# Patient Record
Sex: FEMALE | Race: ASIAN | NOT HISPANIC OR LATINO
[De-identification: names, ages, dates, MRNs, and addresses within clinical notes are randomized per-mention and may not be internally consistent; named-entity substitution may affect disease eponyms.]

---

## 2022-01-31 ENCOUNTER — RESULT REVIEW (OUTPATIENT)
Age: 32
End: 2022-01-31

## 2022-02-17 ENCOUNTER — ASOB RESULT (OUTPATIENT)
Age: 32
End: 2022-02-17

## 2022-02-17 ENCOUNTER — APPOINTMENT (OUTPATIENT)
Dept: ANTEPARTUM | Facility: CLINIC | Age: 32
End: 2022-02-17
Payer: COMMERCIAL

## 2022-02-17 PROBLEM — Z00.00 ENCOUNTER FOR PREVENTIVE HEALTH EXAMINATION: Status: ACTIVE | Noted: 2022-02-17

## 2022-02-17 PROCEDURE — 76813 OB US NUCHAL MEAS 1 GEST: CPT | Mod: 59

## 2022-02-17 PROCEDURE — 36416 COLLJ CAPILLARY BLOOD SPEC: CPT

## 2022-02-17 PROCEDURE — 76801 OB US < 14 WKS SINGLE FETUS: CPT

## 2022-02-24 ENCOUNTER — TRANSCRIPTION ENCOUNTER (OUTPATIENT)
Age: 32
End: 2022-02-24

## 2022-04-11 ENCOUNTER — APPOINTMENT (OUTPATIENT)
Dept: ANTEPARTUM | Facility: CLINIC | Age: 32
End: 2022-04-11
Payer: COMMERCIAL

## 2022-04-11 ENCOUNTER — ASOB RESULT (OUTPATIENT)
Age: 32
End: 2022-04-11

## 2022-04-11 PROCEDURE — 76811 OB US DETAILED SNGL FETUS: CPT

## 2022-04-11 PROCEDURE — 99202 OFFICE O/P NEW SF 15 MIN: CPT | Mod: 25

## 2022-05-10 ENCOUNTER — APPOINTMENT (OUTPATIENT)
Dept: PEDIATRIC CARDIOLOGY | Facility: CLINIC | Age: 32
End: 2022-05-10
Payer: COMMERCIAL

## 2022-05-10 PROCEDURE — 93325 DOPPLER ECHO COLOR FLOW MAPG: CPT | Mod: 59

## 2022-05-10 PROCEDURE — 99203 OFFICE O/P NEW LOW 30 MIN: CPT

## 2022-05-10 PROCEDURE — 76827 ECHO EXAM OF FETAL HEART: CPT

## 2022-05-10 PROCEDURE — 76820 UMBILICAL ARTERY ECHO: CPT

## 2022-05-10 PROCEDURE — 76825 ECHO EXAM OF FETAL HEART: CPT

## 2022-06-08 ENCOUNTER — ASOB RESULT (OUTPATIENT)
Age: 32
End: 2022-06-08

## 2022-06-08 ENCOUNTER — APPOINTMENT (OUTPATIENT)
Dept: ANTEPARTUM | Facility: CLINIC | Age: 32
End: 2022-06-08
Payer: COMMERCIAL

## 2022-06-08 PROCEDURE — 76819 FETAL BIOPHYS PROFIL W/O NST: CPT

## 2022-06-08 PROCEDURE — 76816 OB US FOLLOW-UP PER FETUS: CPT

## 2022-07-08 ENCOUNTER — APPOINTMENT (OUTPATIENT)
Dept: ANTEPARTUM | Facility: CLINIC | Age: 32
End: 2022-07-08

## 2022-07-08 ENCOUNTER — ASOB RESULT (OUTPATIENT)
Age: 32
End: 2022-07-08

## 2022-07-08 PROCEDURE — 76819 FETAL BIOPHYS PROFIL W/O NST: CPT

## 2022-07-08 PROCEDURE — 76816 OB US FOLLOW-UP PER FETUS: CPT

## 2022-08-05 ENCOUNTER — ASOB RESULT (OUTPATIENT)
Age: 32
End: 2022-08-05

## 2022-08-05 ENCOUNTER — APPOINTMENT (OUTPATIENT)
Dept: ANTEPARTUM | Facility: CLINIC | Age: 32
End: 2022-08-05

## 2022-08-05 PROCEDURE — 76819 FETAL BIOPHYS PROFIL W/O NST: CPT

## 2022-08-05 PROCEDURE — 76816 OB US FOLLOW-UP PER FETUS: CPT

## 2022-08-23 ENCOUNTER — INPATIENT (INPATIENT)
Facility: HOSPITAL | Age: 32
LOS: 1 days | Discharge: HOME | End: 2022-08-25
Attending: OBSTETRICS & GYNECOLOGY | Admitting: OBSTETRICS & GYNECOLOGY

## 2022-08-23 VITALS
DIASTOLIC BLOOD PRESSURE: 63 MMHG | TEMPERATURE: 98 F | SYSTOLIC BLOOD PRESSURE: 118 MMHG | HEART RATE: 80 BPM | RESPIRATION RATE: 16 BRPM

## 2022-08-23 DIAGNOSIS — Z90.79 ACQUIRED ABSENCE OF OTHER GENITAL ORGAN(S): Chronic | ICD-10-CM

## 2022-08-23 DIAGNOSIS — O26.899 OTHER SPECIFIED PREGNANCY RELATED CONDITIONS, UNSPECIFIED TRIMESTER: ICD-10-CM

## 2022-08-23 DIAGNOSIS — Z3A.00 WEEKS OF GESTATION OF PREGNANCY NOT SPECIFIED: ICD-10-CM

## 2022-08-23 LAB
BASOPHILS # BLD AUTO: 0.01 K/UL — SIGNIFICANT CHANGE UP (ref 0–0.2)
BASOPHILS NFR BLD AUTO: 0.1 % — SIGNIFICANT CHANGE UP (ref 0–2)
BLD GP AB SCN SERPL QL: NEGATIVE — SIGNIFICANT CHANGE UP
COVID-19 SPIKE DOMAIN AB INTERP: POSITIVE
COVID-19 SPIKE DOMAIN ANTIBODY RESULT: >250 U/ML — HIGH
EOSINOPHIL # BLD AUTO: 0.04 K/UL — SIGNIFICANT CHANGE UP (ref 0–0.5)
EOSINOPHIL NFR BLD AUTO: 0.6 % — SIGNIFICANT CHANGE UP (ref 0–6)
HCT VFR BLD CALC: 45.2 % — HIGH (ref 34.5–45)
HGB BLD-MCNC: 14.6 G/DL — SIGNIFICANT CHANGE UP (ref 11.5–15.5)
IANC: 5.29 K/UL — SIGNIFICANT CHANGE UP (ref 1.8–7.4)
IMM GRANULOCYTES NFR BLD AUTO: 0.4 % — SIGNIFICANT CHANGE UP (ref 0–1.5)
LYMPHOCYTES # BLD AUTO: 1.19 K/UL — SIGNIFICANT CHANGE UP (ref 1–3.3)
LYMPHOCYTES # BLD AUTO: 17.2 % — SIGNIFICANT CHANGE UP (ref 13–44)
MCHC RBC-ENTMCNC: 29.3 PG — SIGNIFICANT CHANGE UP (ref 27–34)
MCHC RBC-ENTMCNC: 32.3 GM/DL — SIGNIFICANT CHANGE UP (ref 32–36)
MCV RBC AUTO: 90.8 FL — SIGNIFICANT CHANGE UP (ref 80–100)
MONOCYTES # BLD AUTO: 0.37 K/UL — SIGNIFICANT CHANGE UP (ref 0–0.9)
MONOCYTES NFR BLD AUTO: 5.3 % — SIGNIFICANT CHANGE UP (ref 2–14)
NEUTROPHILS # BLD AUTO: 5.29 K/UL — SIGNIFICANT CHANGE UP (ref 1.8–7.4)
NEUTROPHILS NFR BLD AUTO: 76.4 % — SIGNIFICANT CHANGE UP (ref 43–77)
NRBC # BLD: 0 /100 WBCS — SIGNIFICANT CHANGE UP (ref 0–0)
NRBC # FLD: 0 K/UL — SIGNIFICANT CHANGE UP (ref 0–0)
PLATELET # BLD AUTO: 153 K/UL — SIGNIFICANT CHANGE UP (ref 150–400)
RBC # BLD: 4.98 M/UL — SIGNIFICANT CHANGE UP (ref 3.8–5.2)
RBC # FLD: 12.8 % — SIGNIFICANT CHANGE UP (ref 10.3–14.5)
RH IG SCN BLD-IMP: POSITIVE — SIGNIFICANT CHANGE UP
RH IG SCN BLD-IMP: POSITIVE — SIGNIFICANT CHANGE UP
SARS-COV-2 IGG+IGM SERPL QL IA: >250 U/ML — HIGH
SARS-COV-2 IGG+IGM SERPL QL IA: POSITIVE
SARS-COV-2 RNA SPEC QL NAA+PROBE: DETECTED
WBC # BLD: 6.93 K/UL — SIGNIFICANT CHANGE UP (ref 3.8–10.5)
WBC # FLD AUTO: 6.93 K/UL — SIGNIFICANT CHANGE UP (ref 3.8–10.5)

## 2022-08-23 RX ORDER — OXYTOCIN 10 UNIT/ML
333.33 VIAL (ML) INJECTION
Qty: 20 | Refills: 0 | Status: DISCONTINUED | OUTPATIENT
Start: 2022-08-23 | End: 2022-08-24

## 2022-08-23 RX ORDER — CITRIC ACID/SODIUM CITRATE 300-500 MG
15 SOLUTION, ORAL ORAL EVERY 6 HOURS
Refills: 0 | Status: DISCONTINUED | OUTPATIENT
Start: 2022-08-23 | End: 2022-08-24

## 2022-08-23 RX ORDER — SODIUM CHLORIDE 9 MG/ML
1000 INJECTION, SOLUTION INTRAVENOUS
Refills: 0 | Status: DISCONTINUED | OUTPATIENT
Start: 2022-08-23 | End: 2022-08-24

## 2022-08-23 RX ORDER — AMPICILLIN TRIHYDRATE 250 MG
1 CAPSULE ORAL EVERY 4 HOURS
Refills: 0 | Status: DISCONTINUED | OUTPATIENT
Start: 2022-08-23 | End: 2022-08-24

## 2022-08-23 RX ORDER — CHLORHEXIDINE GLUCONATE 213 G/1000ML
1 SOLUTION TOPICAL ONCE
Refills: 0 | Status: DISCONTINUED | OUTPATIENT
Start: 2022-08-23 | End: 2022-08-24

## 2022-08-23 RX ORDER — AMPICILLIN TRIHYDRATE 250 MG
2 CAPSULE ORAL ONCE
Refills: 0 | Status: COMPLETED | OUTPATIENT
Start: 2022-08-23 | End: 2022-08-23

## 2022-08-23 RX ADMIN — Medication 108 GRAM(S): at 22:08

## 2022-08-23 RX ADMIN — SODIUM CHLORIDE 125 MILLILITER(S): 9 INJECTION, SOLUTION INTRAVENOUS at 18:54

## 2022-08-23 RX ADMIN — Medication 216 GRAM(S): at 18:00

## 2022-08-23 NOTE — OB PROVIDER H&P - HISTORY OF PRESENT ILLNESS
33 yo  38w6d presenting with episode of leaking brownish / dark reddish vaginal discharge and contractions q5-10 minutes this morning at 08:00. Notes that underwear was wet. Was unsure if water broke so waited and had a similar episode 2 hours ago, prompting her to call her obstetrician who recommended she get evaluated. + FM.   A/P Course: IVF pregnancy. Notes was told has, "bulky" placenta. Otherwise denies  NKDA  POBHx;  - Ectopic pregnancy , IVF  PMHx:  - Denies   PGYNHx:  - Hx of HSV, no recent outbreaks within last several years, been ppx on valtrex since 36w  - Ectopic pregnancy  resulting in left salpingectomy  -  R salpingectomy during "procedure for endometriosis." Pt unable to provide further details      31 yo  38w6d presenting with episode of leaking brownish / dark reddish vaginal discharge and contractions q5-10 minutes this morning at 08:00. Notes that underwear was wet. Was unsure if water broke so waited and had a similar episode 2 hours ago, prompting her to call her obstetrician who recommended she presents for evaluation. + FM.     A/P Course: IVF pregnancy. Notes was told has, "bulky" placenta. Otherwise denies  NKDA  POBHx;  - Ectopic pregnancy , IVF  PMHx:  - Denies   PGYNHx:  - Hx of HSV, no recent outbreaks within last several years, been ppx on valtrex since 36w  - Ectopic pregnancy  resulting in left salpingectomy  -  R salpingectomy during "procedure for endometriosis." Pt unable to provide further details

## 2022-08-23 NOTE — OB PROVIDER H&P - ASSESSMENT
33 yo  38w6d presenting with episode of leaking brownish / dark reddish vaginal discharge and contractions q5-10 minutes this morning at 08:00 c/w labor at term. Indeterminate regarding ROM, unable to perform amnisure due to bloody discharge. Patient desires epidural. GBS +. 6#8, per patient 2-3 weeks ago.     Plan  - Admit to labor and delivery for labor at term  - Patient and partner informed of plan and demonstrate understanding. All questions answered. Consents signed. Covid swabbed and sent.     Plan d/w Dr. Sevilla 31 yo  38w6d shows evidence of active labor and requesting for epidural at this time    Plan  - Admit to labor and delivery for labor at term  - Patient and partner informed of plan and demonstrate understanding. All questions answered. Consents signed. Covid swabbed and sent.   - GBS positive, for Ampicillin  Plan d/w Dr. Sevilla

## 2022-08-23 NOTE — OB PROVIDER TRIAGE NOTE - HISTORY OF PRESENT ILLNESS
33 yo  38w6d presenting with episode of leaking brownish / dark reddish vaginal discharge and contractions q5-10 minutes this morning at 08:00. Notes that underwear was wet. Was unsure if water broke so waited and had a similar episode 2 hours ago, prompting her to call her obstetrician who recommended she get evaluated. + FM.   A/P Course: IVF pregnancy. Notes was told has, "bulky" placenta. Otherwise denies  NKDA  POBHx;  - Ectopic pregnancy , IVF  PMHx:  - Denies   PGYNHx:  - Hx of HSV, no recent outbreaks within last several years, been ppx on valtrex since 36w  - Ectopic pregnancy  resulting in left salpingectomy  -  R salpingectomy during "procedure for endometriosis." Pt unable to provide further details

## 2022-08-23 NOTE — OB PROVIDER H&P - NSHPPHYSICALEXAM_GEN_ALL_CORE
Vital Signs Last 24 Hrs  T(C): 36.4 (23 Aug 2022 13:18), Max: 36.4 (23 Aug 2022 13:18)  T(F): 97.5 (23 Aug 2022 13:18), Max: 97.5 (23 Aug 2022 13:18)  HR: 88 (23 Aug 2022 16:51) (74 - 88)  BP: 111/81 (23 Aug 2022 16:51) (111/78 - 126/90)  BP(mean): --  RR: 16 (23 Aug 2022 13:18) (16 - 16)    General: Female sitting comfortably between contractions last approx 15-20 seconds every 3-5 minutes  Head: Normocephalic. Atraumatic.   Eyes: No discharge, lids normal, conjunctiva normal  Abdomen: Soft, nontender. Gravid.   Lungs: No resp distress  SSE: Dark brown mucous / blood present at posterior fornix. 3 large cotton swabs required to remove bloody discharge. Nitrazine equivocal. Ferning negative.   SVE: 4 / 70 / -3  TAUS: Vertex.  Neuro: No facial asymmetry, no slurred speech, moves all 4 extremities  Mood: Alert and lucid, appropriate mood and affect Vital Signs Last 24 Hrs  T(C): 36.4 (23 Aug 2022 13:18), Max: 36.4 (23 Aug 2022 13:18)  T(F): 97.5 (23 Aug 2022 13:18), Max: 97.5 (23 Aug 2022 13:18)  HR: 88 (23 Aug 2022 16:51) (74 - 88)  BP: 111/81 (23 Aug 2022 16:51) (111/78 - 126/90)  BP(mean): --  RR: 16 (23 Aug 2022 13:18) (16 - 16)    General: Female sitting comfortably between contractions last approx 15-20 seconds every 3-5 minutes  Head: Normocephalic. Atraumatic.   Eyes: No discharge, lids normal, conjunctiva normal  Abdomen: Soft, nontender. Gravid.   Lungs: No resp distress  SSE: No evidence of lesions or vesicles at external genitalia or at vaginal walls. Dark brown mucous / blood present at posterior fornix. 3 large cotton swabs required to remove bloody discharge. Nitrazine equivocal. [place] negative.   SVE: 4 / 70 / -3  TAUS: Vertex.  Neuro: No facial asymmetry, no slurred speech, moves all 4 extremities  Mood: Alert and lucid, appropriate mood and affect

## 2022-08-23 NOTE — OB PROVIDER TRIAGE NOTE - NSOBPROVIDERNOTE_OBGYN_ALL_OB_FT
33 yo  38w6d presenting with episode of leaking brownish / dark reddish vaginal discharge and contractions q5-10 minutes this morning at 08:00 c/w labor at term. Indeterminate regarding ROM, unable to perform amnisure due to bloody discharge. Patient desires epidural. GBS +. 6#8, per patient 2-3 weeks ago.     Plan  - Admit to labor and delivery for labor at term  - Patient and partner informed of plan and demonstrate understanding. All questions answered. Consents signed. Covid swabbed and sent.     Plan d/w Dr. Sevilla

## 2022-08-23 NOTE — OB PROVIDER H&P - PROBLEM SELECTOR PLAN 1
31 yo  38w6d shows evidence of active labor and requesting for epidural at this time    Plan  - Admit to labor and delivery for labor at term  - Patient and partner informed of plan and demonstrate understanding. All questions answered. Consents signed. Covid swabbed and sent.   - GBS positive, for Ampicillin  Plan d/w Dr. Sevilla

## 2022-08-23 NOTE — OB RN PATIENT PROFILE - DATE OF FIRST COVID-19 BOOSTER
Product 15 Price (In Dollars - Numeric Only, No Special Characters Or $): 0.00 Product 8 Units: 0 Name Of Product 2: UV Clear (untinted) Name Of Product 1: UV Clear (tinted) Name Of Product 3: UV Daily Send Charges To Patient Encounter: No Product 1 Application Directions: Apply to entire face every morning, after applying medications. Allow Plan To Count Towards E/M Coding: Yes Risk Of Complication Category: Low (OTC Medications) Detail Level: Zone Product 2 Units: 1 Assigning Risk Information: Per AMA, level of risk is based upon consequences of the problem(s) addressed at the encounter when appropriately treated. Risk also includes medical decision making related to the need to initiate or forego further testing, treatment and/or hospitalization. Over the counter medication are assigned a risk level of low. Prescription medication management is assigned a risk level of moderate. 25-Mar-2022

## 2022-08-23 NOTE — OB RN PATIENT PROFILE - FALL HARM RISK - UNIVERSAL INTERVENTIONS
Bed in lowest position, wheels locked, appropriate side rails in place/Call bell, personal items and telephone in reach/Instruct patient to call for assistance before getting out of bed or chair/Non-slip footwear when patient is out of bed/[place] to call system/Physically safe environment - no spills, clutter or unnecessary equipment/Purposeful Proactive Rounding/Room/bathroom lighting operational, light cord in reach

## 2022-08-24 ENCOUNTER — TRANSCRIPTION ENCOUNTER (OUTPATIENT)
Age: 32
End: 2022-08-24

## 2022-08-24 LAB — T PALLIDUM AB TITR SER: NEGATIVE — SIGNIFICANT CHANGE UP

## 2022-08-24 RX ORDER — PRAMOXINE HYDROCHLORIDE 150 MG/15G
1 AEROSOL, FOAM RECTAL EVERY 4 HOURS
Refills: 0 | Status: DISCONTINUED | OUTPATIENT
Start: 2022-08-24 | End: 2022-08-25

## 2022-08-24 RX ORDER — HYDROCORTISONE 1 %
1 OINTMENT (GRAM) TOPICAL EVERY 6 HOURS
Refills: 0 | Status: DISCONTINUED | OUTPATIENT
Start: 2022-08-24 | End: 2022-08-25

## 2022-08-24 RX ORDER — BENZOCAINE 10 %
1 GEL (GRAM) MUCOUS MEMBRANE EVERY 6 HOURS
Refills: 0 | Status: DISCONTINUED | OUTPATIENT
Start: 2022-08-24 | End: 2022-08-25

## 2022-08-24 RX ORDER — ACETAMINOPHEN 500 MG
3 TABLET ORAL
Qty: 0 | Refills: 0 | DISCHARGE
Start: 2022-08-24

## 2022-08-24 RX ORDER — SODIUM CHLORIDE 9 MG/ML
1000 INJECTION INTRAMUSCULAR; INTRAVENOUS; SUBCUTANEOUS
Refills: 0 | Status: DISCONTINUED | OUTPATIENT
Start: 2022-08-24 | End: 2022-08-24

## 2022-08-24 RX ORDER — VALACYCLOVIR 500 MG/1
0 TABLET, FILM COATED ORAL
Qty: 0 | Refills: 0 | DISCHARGE

## 2022-08-24 RX ORDER — OXYCODONE HYDROCHLORIDE 5 MG/1
5 TABLET ORAL
Refills: 0 | Status: DISCONTINUED | OUTPATIENT
Start: 2022-08-24 | End: 2022-08-25

## 2022-08-24 RX ORDER — IBUPROFEN 200 MG
600 TABLET ORAL EVERY 6 HOURS
Refills: 0 | Status: DISCONTINUED | OUTPATIENT
Start: 2022-08-24 | End: 2022-08-25

## 2022-08-24 RX ORDER — IBUPROFEN 200 MG
1 TABLET ORAL
Qty: 0 | Refills: 0 | DISCHARGE
Start: 2022-08-24

## 2022-08-24 RX ORDER — LANOLIN
1 OINTMENT (GRAM) TOPICAL EVERY 6 HOURS
Refills: 0 | Status: DISCONTINUED | OUTPATIENT
Start: 2022-08-24 | End: 2022-08-25

## 2022-08-24 RX ORDER — DIPHENHYDRAMINE HCL 50 MG
25 CAPSULE ORAL EVERY 6 HOURS
Refills: 0 | Status: DISCONTINUED | OUTPATIENT
Start: 2022-08-24 | End: 2022-08-25

## 2022-08-24 RX ORDER — OXYCODONE HYDROCHLORIDE 5 MG/1
5 TABLET ORAL ONCE
Refills: 0 | Status: DISCONTINUED | OUTPATIENT
Start: 2022-08-24 | End: 2022-08-25

## 2022-08-24 RX ORDER — SODIUM CHLORIDE 9 MG/ML
500 INJECTION INTRAMUSCULAR; INTRAVENOUS; SUBCUTANEOUS ONCE
Refills: 0 | Status: DISCONTINUED | OUTPATIENT
Start: 2022-08-24 | End: 2022-08-24

## 2022-08-24 RX ORDER — SIMETHICONE 80 MG/1
80 TABLET, CHEWABLE ORAL EVERY 4 HOURS
Refills: 0 | Status: DISCONTINUED | OUTPATIENT
Start: 2022-08-24 | End: 2022-08-25

## 2022-08-24 RX ORDER — ACETAMINOPHEN 500 MG
975 TABLET ORAL
Refills: 0 | Status: DISCONTINUED | OUTPATIENT
Start: 2022-08-24 | End: 2022-08-25

## 2022-08-24 RX ORDER — AER TRAVELER 0.5 G/1
1 SOLUTION RECTAL; TOPICAL EVERY 4 HOURS
Refills: 0 | Status: DISCONTINUED | OUTPATIENT
Start: 2022-08-24 | End: 2022-08-25

## 2022-08-24 RX ORDER — OXYTOCIN 10 UNIT/ML
333.33 VIAL (ML) INJECTION
Qty: 20 | Refills: 0 | Status: DISCONTINUED | OUTPATIENT
Start: 2022-08-24 | End: 2022-08-24

## 2022-08-24 RX ORDER — SODIUM CHLORIDE 9 MG/ML
3 INJECTION INTRAMUSCULAR; INTRAVENOUS; SUBCUTANEOUS EVERY 8 HOURS
Refills: 0 | Status: DISCONTINUED | OUTPATIENT
Start: 2022-08-24 | End: 2022-08-25

## 2022-08-24 RX ORDER — MAGNESIUM HYDROXIDE 400 MG/1
30 TABLET, CHEWABLE ORAL
Refills: 0 | Status: DISCONTINUED | OUTPATIENT
Start: 2022-08-24 | End: 2022-08-25

## 2022-08-24 RX ORDER — KETOROLAC TROMETHAMINE 30 MG/ML
30 SYRINGE (ML) INJECTION ONCE
Refills: 0 | Status: DISCONTINUED | OUTPATIENT
Start: 2022-08-24 | End: 2022-08-24

## 2022-08-24 RX ORDER — OXYTOCIN 10 UNIT/ML
2 VIAL (ML) INJECTION
Qty: 30 | Refills: 0 | Status: DISCONTINUED | OUTPATIENT
Start: 2022-08-24 | End: 2022-08-24

## 2022-08-24 RX ORDER — TETANUS TOXOID, REDUCED DIPHTHERIA TOXOID AND ACELLULAR PERTUSSIS VACCINE, ADSORBED 5; 2.5; 8; 8; 2.5 [IU]/.5ML; [IU]/.5ML; UG/.5ML; UG/.5ML; UG/.5ML
0.5 SUSPENSION INTRAMUSCULAR ONCE
Refills: 0 | Status: DISCONTINUED | OUTPATIENT
Start: 2022-08-24 | End: 2022-08-25

## 2022-08-24 RX ORDER — IBUPROFEN 200 MG
600 TABLET ORAL EVERY 6 HOURS
Refills: 0 | Status: COMPLETED | OUTPATIENT
Start: 2022-08-24 | End: 2023-07-23

## 2022-08-24 RX ORDER — DIBUCAINE 1 %
1 OINTMENT (GRAM) RECTAL EVERY 6 HOURS
Refills: 0 | Status: DISCONTINUED | OUTPATIENT
Start: 2022-08-24 | End: 2022-08-25

## 2022-08-24 RX ADMIN — SODIUM CHLORIDE 3 MILLILITER(S): 9 INJECTION INTRAMUSCULAR; INTRAVENOUS; SUBCUTANEOUS at 18:13

## 2022-08-24 RX ADMIN — Medication 600 MILLIGRAM(S): at 23:37

## 2022-08-24 RX ADMIN — Medication 600 MILLIGRAM(S): at 23:03

## 2022-08-24 RX ADMIN — Medication 108 GRAM(S): at 02:05

## 2022-08-24 RX ADMIN — SODIUM CHLORIDE 3 MILLILITER(S): 9 INJECTION INTRAMUSCULAR; INTRAVENOUS; SUBCUTANEOUS at 22:54

## 2022-08-24 RX ADMIN — Medication 2 MILLIUNIT(S)/MIN: at 01:33

## 2022-08-24 NOTE — OB PROVIDER DELIVERY SUMMARY - NSPROVIDERDELIVERYNOTE_OBGYN_ALL_OB_FT
Pt became fd and pushed to deliver a liveborn male 7lb4oz over 2nd perineal laceration  Placenta delivered spontaneously with 3vc  Laceration repaired with 2-0 chromic with excellent hemostasis and restoration of anatomy

## 2022-08-24 NOTE — OB RN DELIVERY SUMMARY - NS_SEPSISRSKCALC_OBGYN_ALL_OB_FT
EOS calculated successfully. EOS Risk Factor: 0.5/1000 live births ([facility] national incidence); GA=39w;Temp=98.24; ROM=2.2; GBS='Positive'; Antibiotics='GBS specific antibiotics > 2 hrs prior to birth'

## 2022-08-24 NOTE — LACTATION INITIAL EVALUATION - POTENTIAL FOR
ineffective breastfeeding/sore breast/s/sore nipples/engorgement/knowledge deficit/feeding confusion/latch on difficulty/low supply

## 2022-08-24 NOTE — DISCHARGE NOTE OB - NS MD DC FALL RISK RISK
For information on Fall & Injury Prevention, visit: [email or web address] OR  [email or web address] OR  https://www.cdc.gov/steadi/patient.html

## 2022-08-24 NOTE — OB RN DELIVERY SUMMARY - NSSELHIDDEN_OBGYN_ALL_OB_FT
[NS_DeliveryAttending1_OBGYN_ALL_OB_FT:[id number]],[NS_DeliveryRN_OBGYN_ALL_OB_FT:[id number]=]

## 2022-08-24 NOTE — DISCHARGE NOTE OB - CARE PROVIDER_API CALL
Tamar Michele)  Obstetrics and Gynecology  [address], [address]  [place] [address]  Phone: [phone]  Fax: [phone]  Established Patient  Follow Up Time: Routine

## 2022-08-24 NOTE — OB PROVIDER LABOR PROGRESS NOTE - ASSESSMENT
Plan: 32y y/o  @39w in stable condition  - IUPC in place - tracing has improved since a deep variable ~15 mins ago. Will not amnioinfuse at this time  - Will continue to monitor ctx pattern  - Con't augmentation with Pitocin  - Continuous EFM, [place]  - Con't IVF    Jessica Jules PGY1
Plan: 32y y/o  @ 39w in stable condition  - Pitocin paused  - Patient repositioned to high patel's  - Pt to labor down for recovery of tracing  - Continue w/ IUPC/AI  - Continuous EFM, [place]  - Con't IVF    d/w attending physician Dr. Di Zuniga MD  PGY-2

## 2022-08-24 NOTE — OB PROVIDER LABOR PROGRESS NOTE - NS_SUBJECTIVE/OBJECTIVE_OBGYN_ALL_OB_FT
R1 Labor & Delivery Progress Note     Pt seen & examined at bedside for placement of IUPC after SROM. Pt comfortable with epidural.    T(C): 36.8 (08-24-22 @ 02:05), Max: 36.8 (08-24-22 @ 02:05)  HR: 80 (08-24-22 @ 03:33) (65 - 120)  BP: 98/62 (08-24-22 @ 03:25) (76/50 - 160/87)  RR: 18 (08-23-22 @ 17:50) (16 - 18)  SpO2: 97% (08-24-22 @ 03:30) (92% - 99%)

## 2022-08-24 NOTE — DISCHARGE NOTE OB - PATIENT PORTAL LINK FT
You can access the FollowMyHealth Patient Portal offered by [facility] by registering at the following website: [email or web address]. By joining "[employer]’s FollowMyHealth portal, you will also be able to view your health information using other applications (apps) compatible with our system.

## 2022-08-24 NOTE — DISCHARGE NOTE OB - MEDICATION SUMMARY - MEDICATIONS TO TAKE
I will START or STAY ON the medications listed below when I get home from the hospital:    acetaminophen 325 mg oral tablet  -- 3 tab(s) by mouth every 8 hours, As Needed - for moderate pain  -- Indication: For pain    ibuprofen 600 mg oral tablet  -- 1 tab(s) by mouth every 6 hours  -- Indication: For pain

## 2022-08-24 NOTE — OB PROVIDER LABOR PROGRESS NOTE - NS_SUBJECTIVE/OBJECTIVE_OBGYN_ALL_OB_FT
R2 Labor & Delivery Progress Note     Pt seen & examined at bedside for cervical exam following recurrent late decelerations.    T(C): 36.7 (08-24-22 @ 03:54), Max: 36.8 (08-24-22 @ 02:05)  HR: 91 (08-24-22 @ 04:30) (65 - 120)  BP: 111/75 (08-24-22 @ 04:24) (76/50 - 160/87)  RR: 18 (08-23-22 @ 17:50) (16 - 18)  SpO2: 96% (08-24-22 @ 04:30) (92% - 99%)

## 2022-08-24 NOTE — OB PROVIDER LABOR PROGRESS NOTE - NS_SUBJECTIVE/OBJECTIVE_OBGYN_ALL_OB_FT
R1 Labor & Delivery Progress Note     Pt seen & examined at bedside for _____.    SVE:  EFM: /mod. variability/+accels/-decels  [place]: q ____ min    T(C): 36.6 (08-23-22 @ 22:07), Max: 36.6 (08-23-22 @ 17:50)  HR: 86 (08-24-22 @ 00:40) (65 - 120)  BP: 106/61 (08-24-22 @ 00:39) (76/50 - 160/87)  RR: 18 (08-23-22 @ 17:50) (16 - 18)  SpO2: 93% (08-24-22 @ 00:44) (93% - 99%)    Plan: 32y y/o G P @ in stable condition  - Con't IOL  - Continuous EFM, [place]  - Con't IVF    d/w attending physician Dr. Di Ureña  PGY-1      4 PIt R1 Labor & Delivery Progress Note     Pt seen & examined at bedside for labor progression. Pt w/o any complaints at this time.     SVE: //-3  EFM: 145/mod. variability/+accels/-decels  [place]: irregular ctx pattern     T(C): 36.6 (22 @ 22:07), Max: 36.6 (22 @ 17:50)  HR: 86 (22 @ 00:40) (65 - 120)  BP: 106/61 (22 @ 00:39) (76/50 - 160/87)  RR: 18 (22 @ 17:50) (16 - 18)  SpO2: 93% (22 @ 00:44) (93% - 99%)    Plan: 32y y/o  @39w in stable condition. Pt has not progressed from 4cm dilated for over 8 hours. Discussed labor augmentation options. Pt is amenable to pitocin.   - Start pitocin 2x2   - Con't IOL  - Continuous EFM, [place]  - Con't IVF    d/w attending physician Dr. Di Ureña  PGY-1      4 PIt

## 2022-08-25 RX ADMIN — Medication 600 MILLIGRAM(S): at 17:17

## 2022-08-25 RX ADMIN — Medication 600 MILLIGRAM(S): at 16:16

## 2022-08-25 RX ADMIN — SODIUM CHLORIDE 3 MILLILITER(S): 9 INJECTION INTRAMUSCULAR; INTRAVENOUS; SUBCUTANEOUS at 17:16

## 2022-08-25 RX ADMIN — Medication 975 MILLIGRAM(S): at 09:07

## 2022-08-25 RX ADMIN — Medication 975 MILLIGRAM(S): at 09:45

## 2022-08-25 NOTE — PROGRESS NOTE ADULT - SUBJECTIVE AND OBJECTIVE BOX
S: Patient doing well. Minimal lochia. Pain controlled.    O: Vital Signs Last 24 Hrs  T(C): 36.8 (25 Aug 2022 11:00), Max: 36.8 (25 Aug 2022 11:00)  T(F): 98.3 (25 Aug 2022 11:00), Max: 98.3 (25 Aug 2022 11:00)  HR: 74 (25 Aug 2022 11:00) (74 - 79)  BP: 116/58 (25 Aug 2022 11:00) (101/53 - 116/58)  BP(mean): --  RR: 18 (25 Aug 2022 11:00) (18 - 18)  SpO2: 99% (25 Aug 2022 11:00) (97% - 100%)    Parameters below as of 25 Aug 2022 11:00  Patient On (Oxygen Delivery Method): room air        Gen: NAD  Abd: soft, NT, ND, fundus firm below umbilicus  Lochia: moderate  Ext: no tenderness    Labs:                        14.6   6.93  )-----------( 153      ( 23 Aug 2022 17:30 )             45.2       A: 32y PPD#1  s/p  doing well.    Plan: patient pp benign exam / Covid + asymptomatic except for mild cough dry no issues , normal WBC no symptoms otherwise   patient desires to go home [facility] [place]

## 2022-08-26 VITALS
SYSTOLIC BLOOD PRESSURE: 101 MMHG | HEART RATE: 71 BPM | RESPIRATION RATE: 18 BRPM | TEMPERATURE: 98 F | OXYGEN SATURATION: 100 % | DIASTOLIC BLOOD PRESSURE: 72 MMHG

## 2024-11-27 NOTE — LACTATION INITIAL EVALUATION - BREAST CANCER
Post-Op Assessment Note    CV Status:  Stable    Pain management: adequate       Mental Status:  Alert and awake   Hydration Status:  Euvolemic   PONV Controlled:  Controlled   Airway Patency:  Patent     Post Op Vitals Reviewed: Yes    No anethesia notable event occurred.    Staff: Anesthesiologist           Last Filed PACU Vitals:  Vitals Value Taken Time   Temp 97.6 °F (36.4 °C) 11/27/24 1410   Pulse 77 11/27/24 1412   /70 11/27/24 1410   Resp 12 11/27/24 1412   SpO2 98 % 11/27/24 1412   Vitals shown include unfiled device data.    Modified Mandi:  Activity: 2 (limited ROM in RLE d/t block that was given preop) (11/27/2024  2:10 PM)  Respiration: 2 (11/27/2024  2:10 PM)  Circulation: 2 (11/27/2024  2:10 PM)  Consciousness: 1 (11/27/2024  2:10 PM)  Oxygen Saturation: 2 (11/27/2024  2:10 PM)  Modified Mandi Score: 9 (11/27/2024  2:10 PM)        
Post-Op Assessment Note    CV Status:  Stable    Pain management: adequate       Mental Status:  Alert and awake   Hydration Status:  Euvolemic   PONV Controlled:  Controlled   Airway Patency:  Patent     Post Op Vitals Reviewed: Yes    No anethesia notable event occurred.    Staff: CRNA           Last Filed PACU Vitals:  Vitals Value Taken Time   Temp 98 °F (36.7 °C) 11/27/24 1339   Pulse 75 11/27/24 1344   /56 11/27/24 1339   Resp 10 11/27/24 1344   SpO2 99 % 11/27/24 1344   Vitals shown include unfiled device data.    Modified Mandi:  No data recorded      
no

## 2025-01-09 ENCOUNTER — NON-APPOINTMENT (OUTPATIENT)
Age: 35
End: 2025-01-09

## 2025-01-14 ENCOUNTER — ASOB RESULT (OUTPATIENT)
Age: 35
End: 2025-01-14

## 2025-01-14 ENCOUNTER — APPOINTMENT (OUTPATIENT)
Dept: ANTEPARTUM | Facility: CLINIC | Age: 35
End: 2025-01-14
Payer: COMMERCIAL

## 2025-01-14 PROCEDURE — 76801 OB US < 14 WKS SINGLE FETUS: CPT

## 2025-01-14 PROCEDURE — 76813 OB US NUCHAL MEAS 1 GEST: CPT

## 2025-03-11 ENCOUNTER — APPOINTMENT (OUTPATIENT)
Dept: ANTEPARTUM | Facility: CLINIC | Age: 35
End: 2025-03-11
Payer: COMMERCIAL

## 2025-03-11 ENCOUNTER — ASOB RESULT (OUTPATIENT)
Age: 35
End: 2025-03-11

## 2025-03-11 PROCEDURE — 76811 OB US DETAILED SNGL FETUS: CPT

## 2025-03-17 ENCOUNTER — APPOINTMENT (OUTPATIENT)
Dept: ANTEPARTUM | Facility: CLINIC | Age: 35
End: 2025-03-17

## 2025-03-17 ENCOUNTER — ASOB RESULT (OUTPATIENT)
Age: 35
End: 2025-03-17

## 2025-03-17 PROCEDURE — 93325 DOPPLER ECHO COLOR FLOW MAPG: CPT

## 2025-03-17 PROCEDURE — 76825 ECHO EXAM OF FETAL HEART: CPT

## 2025-03-17 PROCEDURE — 76827 ECHO EXAM OF FETAL HEART: CPT

## 2025-03-19 ENCOUNTER — APPOINTMENT (OUTPATIENT)
Dept: ANTEPARTUM | Facility: CLINIC | Age: 35
End: 2025-03-19
Payer: COMMERCIAL

## 2025-03-19 ENCOUNTER — ASOB RESULT (OUTPATIENT)
Age: 35
End: 2025-03-19

## 2025-03-19 PROCEDURE — 76816 OB US FOLLOW-UP PER FETUS: CPT

## 2025-04-17 ENCOUNTER — APPOINTMENT (OUTPATIENT)
Dept: ANTEPARTUM | Facility: CLINIC | Age: 35
End: 2025-04-17

## 2025-07-09 ENCOUNTER — APPOINTMENT (OUTPATIENT)
Dept: ANTEPARTUM | Facility: CLINIC | Age: 35
End: 2025-07-09

## 2025-07-09 ENCOUNTER — TRANSCRIPTION ENCOUNTER (OUTPATIENT)
Age: 35
End: 2025-07-09

## 2025-07-09 ENCOUNTER — INPATIENT (INPATIENT)
Facility: HOSPITAL | Age: 35
LOS: 1 days | Discharge: HOME | End: 2025-07-11
Attending: OBSTETRICS & GYNECOLOGY | Admitting: OBSTETRICS & GYNECOLOGY

## 2025-07-09 VITALS
RESPIRATION RATE: 16 BRPM | DIASTOLIC BLOOD PRESSURE: 69 MMHG | HEART RATE: 85 BPM | TEMPERATURE: 98 F | SYSTOLIC BLOOD PRESSURE: 111 MMHG | OXYGEN SATURATION: 99 %

## 2025-07-09 DIAGNOSIS — Z90.79 ACQUIRED ABSENCE OF OTHER GENITAL ORGAN(S): Chronic | ICD-10-CM

## 2025-07-09 DIAGNOSIS — Z98.890 OTHER SPECIFIED POSTPROCEDURAL STATES: Chronic | ICD-10-CM

## 2025-07-09 DIAGNOSIS — O42.10 PREMATURE RUPTURE OF MEMBRANES, ONSET OF LABOR MORE THAN 24 HOURS FOLLOWING RUPTURE, UNSPECIFIED WEEKS OF GESTATION: ICD-10-CM

## 2025-07-09 DIAGNOSIS — O26.899 OTHER SPECIFIED PREGNANCY RELATED CONDITIONS, UNSPECIFIED TRIMESTER: ICD-10-CM

## 2025-07-09 DIAGNOSIS — O42.90 PREMATURE RUPTURE OF MEMBRANES, UNSPECIFIED AS TO LENGTH OF TIME BETWEEN RUPTURE AND ONSET OF LABOR, UNSPECIFIED WEEKS OF GESTATION: ICD-10-CM

## 2025-07-09 LAB
BASOPHILS # BLD AUTO: 0.02 K/UL — SIGNIFICANT CHANGE UP (ref 0–0.2)
BASOPHILS NFR BLD AUTO: 0.3 % — SIGNIFICANT CHANGE UP (ref 0–2)
BLD GP AB SCN SERPL QL: NEGATIVE — SIGNIFICANT CHANGE UP
EOSINOPHIL # BLD AUTO: 0.12 K/UL — SIGNIFICANT CHANGE UP (ref 0–0.5)
EOSINOPHIL NFR BLD AUTO: 2 % — SIGNIFICANT CHANGE UP (ref 0–6)
HCT VFR BLD CALC: 31.6 % — LOW (ref 34.5–45)
HGB BLD-MCNC: 10.2 G/DL — LOW (ref 11.5–15.5)
IMM GRANULOCYTES # BLD AUTO: 0.03 K/UL — SIGNIFICANT CHANGE UP (ref 0–0.07)
IMM GRANULOCYTES NFR BLD AUTO: 0.5 % — SIGNIFICANT CHANGE UP (ref 0–0.9)
LYMPHOCYTES # BLD AUTO: 1.3 K/UL — SIGNIFICANT CHANGE UP (ref 1–3.3)
LYMPHOCYTES NFR BLD AUTO: 21.7 % — SIGNIFICANT CHANGE UP (ref 13–44)
MCHC RBC-ENTMCNC: 26.8 PG — LOW (ref 27–34)
MCHC RBC-ENTMCNC: 32.3 G/DL — SIGNIFICANT CHANGE UP (ref 32–36)
MCV RBC AUTO: 83.2 FL — SIGNIFICANT CHANGE UP (ref 80–100)
MONOCYTES # BLD AUTO: 0.51 K/UL — SIGNIFICANT CHANGE UP (ref 0–0.9)
MONOCYTES NFR BLD AUTO: 8.5 % — SIGNIFICANT CHANGE UP (ref 2–14)
NEUTROPHILS # BLD AUTO: 4 K/UL — SIGNIFICANT CHANGE UP (ref 1.8–7.4)
NEUTROPHILS NFR BLD AUTO: 67 % — SIGNIFICANT CHANGE UP (ref 43–77)
NRBC # BLD AUTO: 0 K/UL — SIGNIFICANT CHANGE UP (ref 0–0)
NRBC # FLD: 0 K/UL — SIGNIFICANT CHANGE UP (ref 0–0)
NRBC BLD AUTO-RTO: 0 /100 WBCS — SIGNIFICANT CHANGE UP (ref 0–0)
PLATELET # BLD AUTO: 167 K/UL — SIGNIFICANT CHANGE UP (ref 150–400)
PMV BLD: 11.7 FL — SIGNIFICANT CHANGE UP (ref 7–13)
RBC # BLD: 3.8 M/UL — SIGNIFICANT CHANGE UP (ref 3.8–5.2)
RBC # FLD: 13.7 % — SIGNIFICANT CHANGE UP (ref 10.3–14.5)
RH IG SCN BLD-IMP: POSITIVE — SIGNIFICANT CHANGE UP
RH IG SCN BLD-IMP: POSITIVE — SIGNIFICANT CHANGE UP
T PALLIDUM AB TITR SER: NEGATIVE — SIGNIFICANT CHANGE UP
WBC # BLD: 5.98 K/UL — SIGNIFICANT CHANGE UP (ref 3.8–10.5)
WBC # FLD AUTO: 5.98 K/UL — SIGNIFICANT CHANGE UP (ref 3.8–10.5)

## 2025-07-09 RX ORDER — IBUPROFEN 200 MG
600 TABLET ORAL EVERY 6 HOURS
Refills: 0 | Status: DISCONTINUED | OUTPATIENT
Start: 2025-07-09 | End: 2025-07-11

## 2025-07-09 RX ORDER — DIBUCAINE 10 MG/G
1 OINTMENT TOPICAL EVERY 6 HOURS
Refills: 0 | Status: DISCONTINUED | OUTPATIENT
Start: 2025-07-09 | End: 2025-07-11

## 2025-07-09 RX ORDER — OXYTOCIN-SODIUM CHLORIDE 0.9% IV SOLN 30 UNIT/500ML 30-0.9/5 UT/ML-%
167 SOLUTION INTRAVENOUS
Qty: 30 | Refills: 0 | Status: DISCONTINUED | OUTPATIENT
Start: 2025-07-09 | End: 2025-07-09

## 2025-07-09 RX ORDER — PRENATAL 136/IRON/FOLIC ACID 27 MG-1 MG
1 TABLET ORAL DAILY
Refills: 0 | Status: DISCONTINUED | OUTPATIENT
Start: 2025-07-09 | End: 2025-07-11

## 2025-07-09 RX ORDER — KETOROLAC TROMETHAMINE 30 MG/ML
30 INJECTION, SOLUTION INTRAMUSCULAR; INTRAVENOUS ONCE
Refills: 0 | Status: DISCONTINUED | OUTPATIENT
Start: 2025-07-09 | End: 2025-07-09

## 2025-07-09 RX ORDER — WITCH HAZEL LEAF
1 FLUID EXTRACT MISCELLANEOUS EVERY 4 HOURS
Refills: 0 | Status: DISCONTINUED | OUTPATIENT
Start: 2025-07-09 | End: 2025-07-11

## 2025-07-09 RX ORDER — SIMETHICONE 80 MG
80 TABLET,CHEWABLE ORAL EVERY 4 HOURS
Refills: 0 | Status: DISCONTINUED | OUTPATIENT
Start: 2025-07-09 | End: 2025-07-11

## 2025-07-09 RX ORDER — MODIFIED LANOLIN 100 %
1 CREAM (GRAM) TOPICAL EVERY 6 HOURS
Refills: 0 | Status: DISCONTINUED | OUTPATIENT
Start: 2025-07-09 | End: 2025-07-11

## 2025-07-09 RX ORDER — OXYCODONE HYDROCHLORIDE 30 MG/1
5 TABLET ORAL
Refills: 0 | Status: DISCONTINUED | OUTPATIENT
Start: 2025-07-09 | End: 2025-07-11

## 2025-07-09 RX ORDER — BENZOCAINE 220 MG/G
1 SPRAY, METERED PERIODONTAL EVERY 6 HOURS
Refills: 0 | Status: DISCONTINUED | OUTPATIENT
Start: 2025-07-09 | End: 2025-07-11

## 2025-07-09 RX ORDER — IBUPROFEN 200 MG
600 TABLET ORAL EVERY 6 HOURS
Refills: 0 | Status: COMPLETED | OUTPATIENT
Start: 2025-07-09 | End: 2026-06-07

## 2025-07-09 RX ORDER — ACETAMINOPHEN 500 MG/5ML
975 LIQUID (ML) ORAL
Refills: 0 | Status: DISCONTINUED | OUTPATIENT
Start: 2025-07-09 | End: 2025-07-11

## 2025-07-09 RX ORDER — MAGNESIUM HYDROXIDE 400 MG/5ML
30 SUSPENSION ORAL
Refills: 0 | Status: DISCONTINUED | OUTPATIENT
Start: 2025-07-09 | End: 2025-07-11

## 2025-07-09 RX ORDER — HYDROCORTISONE 10 MG/G
1 CREAM TOPICAL EVERY 6 HOURS
Refills: 0 | Status: DISCONTINUED | OUTPATIENT
Start: 2025-07-09 | End: 2025-07-11

## 2025-07-09 RX ORDER — PRAMOXINE HCL 1 %
1 GEL (GRAM) TOPICAL EVERY 4 HOURS
Refills: 0 | Status: DISCONTINUED | OUTPATIENT
Start: 2025-07-09 | End: 2025-07-11

## 2025-07-09 RX ORDER — OXYCODONE HYDROCHLORIDE 30 MG/1
5 TABLET ORAL ONCE
Refills: 0 | Status: DISCONTINUED | OUTPATIENT
Start: 2025-07-09 | End: 2025-07-11

## 2025-07-09 RX ORDER — OXYTOCIN-SODIUM CHLORIDE 0.9% IV SOLN 30 UNIT/500ML 30-0.9/5 UT/ML-%
SOLUTION INTRAVENOUS
Qty: 30 | Refills: 0 | Status: DISCONTINUED | OUTPATIENT
Start: 2025-07-09 | End: 2025-07-09

## 2025-07-09 RX ORDER — DIPHENHYDRAMINE HCL 12.5MG/5ML
25 ELIXIR ORAL EVERY 6 HOURS
Refills: 0 | Status: DISCONTINUED | OUTPATIENT
Start: 2025-07-09 | End: 2025-07-11

## 2025-07-09 RX ORDER — SODIUM CHLORIDE 9 G/1000ML
1000 INJECTION, SOLUTION INTRAVENOUS
Refills: 0 | Status: DISCONTINUED | OUTPATIENT
Start: 2025-07-09 | End: 2025-07-09

## 2025-07-09 RX ADMIN — Medication 3 MILLILITER(S): at 22:09

## 2025-07-09 RX ADMIN — SODIUM CHLORIDE 125 MILLILITER(S): 9 INJECTION, SOLUTION INTRAVENOUS at 03:42

## 2025-07-09 RX ADMIN — Medication 1 APPLICATION(S): at 13:21

## 2025-07-09 RX ADMIN — OXYTOCIN-SODIUM CHLORIDE 0.9% IV SOLN 30 UNIT/500ML 2 MILLIUNIT(S)/MIN: 30-0.9/5 SOLUTION at 03:48

## 2025-07-09 NOTE — OB PROVIDER H&P - HISTORY OF PRESENT ILLNESS
PNC: Dr Walden-Benjamin    36 y/o  @37.4 presents with c/o leaking clear fluids since 1130pm. At time of HPI pt reports +FM. Denies VB, contractions and/ cramping.    Allergies: NKA  Meds: PNV, ASA, Valtrex    Antepartum History:  -HSV II  - GBS neg (25)    MFM sono / / :

## 2025-07-09 NOTE — CHART NOTE - NSCHARTNOTEFT_GEN_A_CORE
PA note    seen & examined for cont of management  comfortable    VS  T(C): 36.6 (07-09-25 @ 11:30)  HR: 73 (07-09-25 @ 13:16)  BP: 88/57 (07-09-25 @ 13:11)  RR: 18 (07-09-25 @ 11:30)  SpO2: 94% (07-09-25 @ 13:16)    130/mod russ/+accels/variable decels s/p AROM forebag  [place] q 2-3min  4-5/70/-2 AROM forebag - IUPC replaced    cont efm/toco  cont pitocin  dw Dr Franklin jasso pa

## 2025-07-09 NOTE — DISCHARGE NOTE OB - MEDICATION SUMMARY - MEDICATIONS TO STOP TAKING
I will STOP taking the medications listed below when I get home from the hospital:    aspirin  -- 2 tab(s) by mouth once a day

## 2025-07-09 NOTE — DISCHARGE NOTE OB - MEDICATION SUMMARY - MEDICATIONS TO TAKE
I will START or STAY ON the medications listed below when I get home from the hospital:    ibuprofen 600 mg oral tablet  -- 1 tab(s) by mouth every 6 hours as needed for  moderate pain  -- Indication: For Pain    acetaminophen 325 mg oral tablet  -- 3 tab(s) by mouth every 6 hours as needed for  moderate pain  -- Indication: For Pain    Prenatal Multivitamins with Folic Acid 1 mg oral tablet  -- 1 tab(s) by mouth once a day  -- Indication: For Nutrition

## 2025-07-09 NOTE — OB PROVIDER H&P - NSHPPHYSICALEXAM_GEN_ALL_CORE
T(C): 36.6 (07-09-25 @ 01:34), Max: 36.6 (07-09-25 @ 01:34)  HR: 86 (07-09-25 @ 01:36) (85 - 86)  BP: 117/60 (07-09-25 @ 01:36) (111/69 - 117/60)  RR: 16 (07-09-25 @ 01:34) (16 - 16)  SpO2: 99% (07-09-25 @ 01:34) (99% - 99%)    Neuro: No facial asymmetry, no slurred speech, moves all 4 extremities  Mood: Alert and lucid, appropriate mood and affect  Head: Normocephalic. Atraumatic.   Heart: Regular rate and rhythm.  Lungs: No respiratory distress.  Abdomen: Soft, nontender. Gravid.   TAUS: cephalic, anterior placenta, mmode 132, MVP 0 Sono saved in ASOB.   NST  Baseline  ( 125 ) BPM  Variability (x )  Moderate   (  ) Minimal  (  ) Absent  (  )  Marked  Accelerations ( x ) 15x15   (  ) 10x10  (  ) no  Decelerations (x  ) no  (  ) Variable  (  ) Early  (  ) Late      Description ___cat I_____  Contractions (  ) no  (x ) yes     Description  ____irregular______  Interpretation x ) reactive   (  )  non-reactive  SSE: No erythema, edema, lesions to external genitalia. Physiologic discharge present. No active, brisk bleeding.  + pooling. + Nitrazine. + Fern. No lesions noted   SVE: 2/50/-3  Exam chaperoned by: SEEVRO Buitrago RN  EFW: 3000 g

## 2025-07-09 NOTE — OB PROVIDER DELIVERY SUMMARY - NSSELHIDDEN_OBGYN_ALL_OB_FT
[NS_DeliveryAttending1_OBGYN_ALL_OB_FT:[id number]],[NS_DeliveryAssist1_OBGYN_ALL_OB_FT:[id number]]

## 2025-07-09 NOTE — OB PROVIDER LABOR PROGRESS NOTE - NS_SUBJECTIVE/OBJECTIVE_OBGYN_ALL_OB_FT
pt comfortable without and epidural on pitocin of 12.  I went to exam pt per Dr. Fraser's request. Minimal cervical change continue pitocin per protocol.     Zulma Perez MD pt comfortable without an epidural on pitocin of 12.  I went to exam pt per Dr. Fraser's request. Minimal cervical change continue pitocin per protocol.     Zulma Perez MD

## 2025-07-09 NOTE — OB RN PATIENT PROFILE - PRESSURE ULCER(S)
Problem: SLP Goal  Goal: SLP LINDA Golden, CCC-SLP  [phone]  2/8/2019           no

## 2025-07-09 NOTE — DISCHARGE NOTE OB - PATIENT PORTAL LINK FT
You can access the FollowMyHealth Patient Portal offered by [facility] by registering at the following website: [email or web address]. By joining [employer] [facility]’s FollowMyHealth portal, you will also be able to view your health information using other applications (apps) compatible with our system.

## 2025-07-09 NOTE — OB RN DELIVERY SUMMARY - NS_SEPSISRSKCALC_OBGYN_ALL_OB_FT
EOS calculated successfully. EOS Risk Factor: 0.5/1000 live births ([place] national incidence); GA=37w4d; Temp=98.2; ROM=15.667; GBS='Negative'; Antibiotics='No antibiotics or any antibiotics < 2 hrs prior to birth'

## 2025-07-09 NOTE — OB PROVIDER LABOR PROGRESS NOTE - ASSESSMENT
36yo  at 37.4wks undergoing labor augmentation 2/2 PROM at 2330 7/8  AMA  IVF pregnancy  h/o HSV  h/o endometriosis  h/o ectopic pregnancy   Cat 1 tracing  Pit at 24mu, now may tritrate  IUPC placed, MVUs 249, adequate  anesthesia called for epidural  continue close maternal and fetal monitoring  anticipate NSVB  Dr. Reid aware and agrees with plan 34yo  at 37.4wks undergoing labor augmentation 2/2 PROM at 2330 7/8  AMA  IVF pregnancy  h/o HSV  h/o endometriosis  h/o ectopic pregnancy   Cat 1 tracing  Pit at 24mu, now may tritrate  IUPC placed, MVUs 249, adequate  anesthesia called for epidural  continue close maternal and fetal monitoring  anticipate NSVB  Dr. Reid aware and agrees with plan    OB Attending  Patient seen and agree with above  pain management  continue pranav Reid

## 2025-07-09 NOTE — DISCHARGE NOTE OB - FINANCIAL ASSISTANCE
[facility] provides services at a reduced cost to those who are determined to be eligible through [facility]’s financial assistance program. Information regarding [facility]’s financial assistance program can be found by going to [email or web address] or by calling [phone].

## 2025-07-09 NOTE — OB PROVIDER LABOR PROGRESS NOTE - NS_SUBJECTIVE/OBJECTIVE_OBGYN_ALL_OB_FT
To room for assessment to place IUPC per Attending  36yo  at 37.4wks undergoing labor augmentation 2/2 PROM at 2330 . Pos FM, pos ctxs, neg VB, pos LOF.  Pt laboring well, aware when she's having a contraction. Tolerating well, family present for support. Desires epidural for pain management

## 2025-07-09 NOTE — OB PROVIDER H&P - ATTENDING COMMENTS
OB Attending Note    Agree w/ above.  P1 presents with PROM.   For IOL with pitocin.   NST category 1.     IMANI Fraser MD

## 2025-07-09 NOTE — OB NEONATOLOGY/PEDIATRICIAN DELIVERY SUMMARY - NSPEDSNEONOTESA_OBGYN_ALL_OB_FT
Baby is a 37.4wk AGA female born to a 34 y/o  mother via . Peds called to delivery for Category 2 tracings. Maternal history HSV1, on valtrex (SSE negative). Prenatal history uncomplicated. Maternal blood type B+. PNL: HIV neg/RPR NR/HBsAg neg/HepC neg/rubella immune. GBS negative on . SROM at 23:30 on , clear fluids. Highest maternal temp 36.7. EOS: 0.14. Baby born vigorous and crying spontaneously. Warmed, dried, stimulated. Apgars . Nuchal x1. Mom plans to breastfeed and consents hepB. Circ declined.     BW: 3180  :   TOB: 1510    Physical Exam:  Gen: NAD, +grimace  HEENT: anterior fontanelle open soft and flat, no cleft lip/palate, ears normal set, no ear pits or tags. no lesions in mouth/throat, nares clinically patent  Resp: no increased work of breathing, good air entry b/l, clear to auscultation bilaterally  Cardio: Normal S1/S2, regular rate and rhythm, no murmurs, rubs or gallops  Abd: soft, non tender, non distended, + bowel sounds, umbilical cord with 3 vessels  Neuro: +grasp/suck/paul, normal tone  Extremities: negative medellin and ortolani, moving all extremities, full range of motion x 4, no crepitus  Skin: pink, warm  Genitals: normal female anatomy, Darvin 1, anus patent

## 2025-07-09 NOTE — OB RN PATIENT PROFILE - NS_OBGYNHISTORY_OBGYN_ALL_OB_FT
FT  22 wt. 7lbs ( IVF )  Ectopic   HSV II  h/o endometriosis FT  22 wt. 7lbs ( IVF )  Ectopic   HSV II (spec -)  h/o endometriosis

## 2025-07-09 NOTE — OB PROVIDER DELIVERY SUMMARY - NSPROVIDERDELIVERYNOTE_OBGYN_ALL_OB_FT
Patient FD and pushing.  Peds in attendance for decels with pushing.   live female over intact perineum.  Pushed through nuchal cord.  Body delivered easily.  Apgars 9+9.  Placenta spontaneous intact.  Cervix, vagina, and perineum inspected.  Uterus firm and empty. 2nd degree laceration repaired with 2-0 chromic.  .  Mother and baby in stable condition.   SANTI Reid

## 2025-07-09 NOTE — OB PROVIDER H&P - ASSESSMENT
Pt is a 35y  admitted for PROM    - Discussed with Dr Salcedo  - Admit to Labor and Delivery  - Continuous EFM  - GBS neg  - Clear diet, IV fluids  - Blood consent signed  - Standard labs (T&S, CBC, RPR)  - Epidural PRN  - Induction/augmentation agent: pitocin    Risks, benefits, alternatives, and possible complications have been discussed in detail with the patient in her native language. Pre-admission, admission, and post admission procedures and expectations were discussed in detail. All questions answered, all appropriate hospital consents were signed. Anticipate normal vaginal delivery.   Informed consent was obtained. The following was discussed:   - Induction/augmentation of labor: use of medication and/or cook balloon to begin or enhance labor   - Obstetrical management including internal fetal/contraction monitoring   - Normal vaginal delivery   - Possible  section     TORRI BURR

## 2025-07-09 NOTE — DISCHARGE NOTE OB - CARE PLAN
Assessment and plan of treatment:	pelvic rest, regular diet, return visit x 6 weeks   1 Principal Discharge DX:	Normal vaginal delivery  Assessment and plan of treatment:	pelvic rest, regular diet, return visit x 6 weeks

## 2025-07-09 NOTE — OB RN DELIVERY SUMMARY - NSSELHIDDEN_OBGYN_ALL_OB_FT
[NS_DeliveryAttending1_OBGYN_ALL_OB_FT:[id number]],[NS_DeliveryAssist1_OBGYN_ALL_OB_FT:[id number]],[NS_DeliveryRN_OBGYN_ALL_OB_FT:[id number]=]

## 2025-07-09 NOTE — OB RN PATIENT PROFILE - BILL OF RIGHTS/ADMISSION INFORMATION PROVIDED TO:
Advance Care Planning   Healthcare Decision Maker:       Primary Decision Maker:  Mayank [facility] [phone]
pt doesn't wish family notification of hospital visit/Patient

## 2025-07-09 NOTE — DISCHARGE NOTE OB - CARE PROVIDER_API CALL
Tamar Rodriguez  Obstetrics & Gynecology  [address], [address]  [place] [address]  Phone: [phone]  Fax: [phone]  Follow Up Time:

## 2025-07-10 LAB
HCT VFR BLD CALC: 31.3 % — LOW (ref 34.5–45)
HGB BLD-MCNC: 9.9 G/DL — LOW (ref 11.5–15.5)

## 2025-07-10 RX ADMIN — Medication 3 MILLILITER(S): at 06:08

## 2025-07-10 RX ADMIN — Medication 1 TABLET(S): at 11:01

## 2025-07-10 RX ADMIN — Medication 975 MILLIGRAM(S): at 22:14

## 2025-07-10 RX ADMIN — Medication 975 MILLIGRAM(S): at 22:44

## 2025-07-10 RX ADMIN — Medication 975 MILLIGRAM(S): at 08:34

## 2025-07-10 RX ADMIN — Medication 975 MILLIGRAM(S): at 09:28

## 2025-07-10 RX ADMIN — Medication 3 MILLILITER(S): at 14:42

## 2025-07-10 NOTE — PROGRESS NOTE ADULT - SUBJECTIVE AND OBJECTIVE BOX
S: Patient doing well. Minimal lochia. Pain controlled.    O: Vital Signs Last 24 Hrs  T(C): 36.6 (10 Jul 2025 18:24), Max: 36.6 (10 Jul 2025 06:00)  T(F): 97.9 (10 Jul 2025 18:24), Max: 97.9 (10 Jul 2025 06:00)  HR: 89 (10 Jul 2025 18:24) (69 - 91)  BP: 110/61 (10 Jul 2025 18:24) (101/58 - 110/61)  BP(mean): --  RR: 18 (10 Jul 2025 18:24) (16 - 18)  SpO2: 100% (10 Jul 2025 18:24) (96% - 100%)    Parameters below as of 10 Jul 2025 18:24  Patient On (Oxygen Delivery Method): room air        Gen: NAD  Abd: soft, NT, ND, fundus firm below umbilicus  Lochia: moderate  Ext: no tenderness    Labs:                        9.9    x     )-----------( x        ( 10 Jul 2025 06:35 )             31.3       A: 35y PPD#1 s/p  doing well.  asymptomatic anemia chronic anemia with acute blood loss     Plan:   continue pp care  plan for discharge in am 25  LAILA Sneed

## 2025-07-11 VITALS
DIASTOLIC BLOOD PRESSURE: 59 MMHG | SYSTOLIC BLOOD PRESSURE: 96 MMHG | HEART RATE: 84 BPM | OXYGEN SATURATION: 99 % | RESPIRATION RATE: 18 BRPM | TEMPERATURE: 98 F

## 2025-07-11 RX ORDER — PRENATAL 136/IRON/FOLIC ACID 27 MG-1 MG
1 TABLET ORAL
Refills: 0 | DISCHARGE

## 2025-07-11 RX ORDER — PRENATAL 136/IRON/FOLIC ACID 27 MG-1 MG
1 TABLET ORAL
Qty: 0 | Refills: 0 | DISCHARGE
Start: 2025-07-11

## 2025-07-11 RX ORDER — IBUPROFEN 200 MG
1 TABLET ORAL
Qty: 0 | Refills: 0 | DISCHARGE
Start: 2025-07-11

## 2025-07-11 RX ORDER — ACETAMINOPHEN 500 MG/5ML
3 LIQUID (ML) ORAL
Qty: 0 | Refills: 0 | DISCHARGE
Start: 2025-07-11

## 2025-07-11 RX ORDER — ASPIRIN 325 MG
2 TABLET ORAL
Refills: 0 | DISCHARGE

## 2025-07-11 RX ADMIN — Medication 975 MILLIGRAM(S): at 14:47

## 2025-07-11 RX ADMIN — Medication 1 TABLET(S): at 11:12

## 2025-07-11 RX ADMIN — Medication 975 MILLIGRAM(S): at 14:17
